# Patient Record
(demographics unavailable — no encounter records)

---

## 2024-11-15 NOTE — ASSESSMENT
[FreeTextEntry1] : Mr. Lopez is a 70 y/o M w/ h/o triple-vessel CAD (unrevascularizable), HFrEF/ICM (EF 20%, LVEDD 7.3 cm; Dx 9/23) w/o ICD w/ prior LV thrombus (resolved; on AC), prior tobacco use (1 ppd x 30 years; quit 10/23), PAD, infrarenal AAA (4.7 cm), HL, renal tumor s/p radical L nephrectomy (35 years prior) and R partial nephrectomy (? small carcinogenous spot), CKD (b/l Cr 1.3-1.5), prior DVT (s/p Rx) who is here for a follow up  Currently compensated, NYHA class II and euvolemic. Despite triple vessel disease, doesn't have any active angina and doesn't have revascularizable disease by PCI or CABG. Of note, had LV thrombus seen on MRI which was no longer seen on echo.   1. HFrEF/ICM  - c/w toprol 25 mg daily  - c/w Entresto 24/26 twice/day; repeat labs in office done with K 5.4 so unable to escalate - c/w hydralazine 10 mg TID - c/w isosorbide 5 mg TID - not on MRA due to creat 1.5->1.37, elevated K and solitary kidney  - on Farxiga 5 mg daily  - no diuretic requirement; given lasix 20 mg prn for weight gain - counseled on disease process  - maintain log of weight/BP - pt given HF booklet to review with his wife - repeat KARYN EF 20% - previously referred to EP for ICD eval  2. CAD - triple vessel disease; unrevascularizable - no active angina - off ASA and is on plavix and Lipitor and Zetia; continue Eliquis for LV thrombus - s/p cardiac MRI which was negative for viability  3. PAD/infrarenal AAA (4.7 cm) - encouraged to continue smoking cessation; didn't tolerate Wellbutrin - vascular dopplers form 1/8/24 which showed hemodynamically significant stenosis in R profunda femoral artery and left common iliac artery stenosis as well as saccular AAA 3.8 cm.  -following Dr. Justus Huerta (vascular surgeon at [facility]).  - BP and lipids control  4. LV thrombus - resolved - on Eliquis 5 mg twice/day; may be able to stop but defer to Dr. Lee  RTC 3 months PA, 6 months with me

## 2024-11-15 NOTE — ASSESSMENT
[FreeTextEntry1] : Mr. Lopez is a 68 y/o M w/ h/o triple-vessel CAD (unrevascularizable), HFrEF/ICM (EF 20%, LVEDD 7.3 cm; Dx 9/23) w/o ICD w/ prior LV thrombus (resolved; on AC), prior tobacco use (1 ppd x 30 years; quit 10/23), PAD, infrarenal AAA (4.7 cm), HL, renal tumor s/p radical L nephrectomy (35 years prior) and R partial nephrectomy (? small carcinogenous spot), CKD (b/l Cr 1.3-1.5), prior DVT (s/p Rx) who is here for a follow up  Currently compensated, NYHA class II and euvolemic. Despite triple vessel disease, doesn't have any active angina and doesn't have revascularizable disease by PCI or CABG. Of note, had LV thrombus seen on MRI which was no longer seen on echo.   1. HFrEF/ICM  - c/w toprol 25 mg daily  - c/w Entresto 24/26 twice/day; repeat labs in office done with K 5.4 so unable to escalate - c/w hydralazine 10 mg TID - c/w isosorbide 5 mg TID - not on MRA due to creat 1.5->1.37, elevated K and solitary kidney  - on Farxiga 5 mg daily  - no diuretic requirement; given lasix 20 mg prn for weight gain - counseled on disease process  - maintain log of weight/BP - pt given HF booklet to review with his wife - repeat KARYN EF 20% - previously referred to EP for ICD eval  2. CAD - triple vessel disease; unrevascularizable - no active angina - off ASA and is on plavix and Lipitor and Zetia; continue Eliquis for LV thrombus - s/p cardiac MRI which was negative for viability  3. PAD/infrarenal AAA (4.7 cm) - encouraged to continue smoking cessation; didn't tolerate Wellbutrin - vascular dopplers form 1/8/24 which showed hemodynamically significant stenosis in R profunda femoral artery and left common iliac artery stenosis as well as saccular AAA 3.8 cm.  -following Dr. Justus Huerta (vascular surgeon at [facility]).  - BP and lipids control  4. LV thrombus - resolved - on Eliquis 5 mg twice/day; may be able to stop but defer to Dr. Lee  RTC 3 months PA, 6 months with me

## 2024-11-15 NOTE — PHYSICAL EXAM
[Well Developed] : well developed [Well Nourished] : well nourished [No Acute Distress] : no acute distress [Normal Conjunctiva] : normal conjunctiva [Normal Venous Pressure] : normal venous pressure [No Carotid Bruit] : no carotid bruit [Normal S1, S2] : normal S1, S2 [No Murmur] : no murmur [No Rub] : no rub [No Gallop] : no gallop [Clear Lung Fields] : clear lung fields [Good Air Entry] : good air entry [No Respiratory Distress] : no respiratory distress  [Soft] : abdomen soft [Non Tender] : non-tender [No Masses/organomegaly] : no masses/organomegaly [Normal Bowel Sounds] : normal bowel sounds [Normal Gait] : normal gait [No Edema] : no edema [No Cyanosis] : no cyanosis [No Clubbing] : no clubbing [No Varicosities] : no varicosities [No Rash] : no rash [No Skin Lesions] : no skin lesions [Moves all extremities] : moves all extremities [No Focal Deficits] : no focal deficits [Normal Speech] : normal speech [Alert and Oriented] : alert and oriented [Normal memory] : normal memory [de-identified] : JVP 6 cm w/o JASONR

## 2024-11-15 NOTE — HISTORY OF PRESENT ILLNESS
[FreeTextEntry1] : Mr. Lopez is a 68 y/o M w/ h/o triple-vessel CAD (unrevascularizable), HFrEF/ICM (EF 20%, LVEDD 7.3 cm; Dx 9/23) w/o ICD w/ prior LV thrombus (resolved; on AC), prior tobacco use (1 ppd x 30 years; quit 10/23), PAD, infrarenal AAA (4.7 cm), HL, renal tumor s/p radical L nephrectomy (35 years prior) and R partial nephrectomy (? small carcinogenous spot), CKD (b/l Cr 1.3-1.5), prior DVT (s/p Rx) who is here for a follow up  Referred by Dr. Deep Bermudez. Also followed by Dr. Christopher Lee. Accompanied by wife, Laura.   For full initial details, please refer to note from 11/17/23.   In past month, had Entresto reduced from 49/51 to 24/26 twice/day due to hyperkalemia. Recently started on Lokelma 5 mg M/W/F. Has been on Farxiga 5 mg daily (hasn't tried 10 mg daily). Last labs 10/17/24 K 4.4, BUN/Cr 35/1.48.    He had a TTE on Aug 9 at Dr. Lee's office which showed EF 20%, LVEDD 6.9 cm, nl RV, mild MR, minimal TR w/ stasis noted but no thrombus.  He was referred to vascular Dr. Huerta ([facility]) for c/o cramping/tightening in legs bilaterally- vascular dopplers form 1/8/24 showed hemodynamically significant stenosis in R profunda femoral artery and left common iliac artery stenosis as well as saccular AAA 3.8 cm.  Has been on Lexapro 5 mg daily for depression with prn Xanax. No longer smoking and using nicotine gum for cravings.   He underwent endoscopy/colonoscopy with Dr. Parmar and treated for H.Pylori. 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   BP at home has been ranging 110-120s. Weight has been stable at 119 pounds. Doesn't use lasix.   Tries to be cognizant about sodium but has indiscretions. Adherent to medications. Drinks approx 3-5 bottles of fluid per day.   He denies changes to his activity and able to walk fair amount distance up to 1-2 miles several times a week. Able to go up 14 steps w/o issues. Denies orthopnea/PND  Denies chest discomfort, palpitations, dizziness/LH, syncope and he does not have an ICD.

## 2024-11-15 NOTE — HISTORY OF PRESENT ILLNESS
[FreeTextEntry1] : Mr. Lopez is a 68 y/o M w/ h/o triple-vessel CAD (unrevascularizable), HFrEF/ICM (EF 20%, LVEDD 7.3 cm; Dx 9/23) w/o ICD w/ prior LV thrombus (resolved; on AC), prior tobacco use (1 ppd x 30 years; quit 10/23), PAD, infrarenal AAA (4.7 cm), HL, renal tumor s/p radical L nephrectomy (35 years prior) and R partial nephrectomy (? small carcinogenous spot), CKD (b/l Cr 1.3-1.5), prior DVT (s/p Rx) who is here for a follow up  Referred by Dr. Deep Bermudez. Also followed by Dr. Christopher Lee. Accompanied by wife, Laura.   For full initial details, please refer to note from 11/17/23.   In past month, had Entresto reduced from 49/51 to 24/26 twice/day due to hyperkalemia. Recently started on Lokelma 5 mg M/W/F. Has been on Farxiga 5 mg daily (hasn't tried 10 mg daily). Last labs 10/17/24 K 4.4, BUN/Cr 35/1.48.    He had a TTE on Aug 9 at Dr. Lee's office which showed EF 20%, LVEDD 6.9 cm, nl RV, mild MR, minimal TR w/ stasis noted but no thrombus.  He was referred to vascular Dr. Huerta ([facility]) for c/o cramping/tightening in legs bilaterally- vascular dopplers form 1/8/24 showed hemodynamically significant stenosis in R profunda femoral artery and left common iliac artery stenosis as well as saccular AAA 3.8 cm.  Has been on Lexapro 5 mg daily for depression with prn Xanax. No longer smoking and using nicotine gum for cravings.   He underwent endoscopy/colonoscopy with Dr. Parmar and treated for H.Pylori. 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   BP at home has been ranging 110-120s. Weight has been stable at 119 pounds. Doesn't use lasix.   Tries to be cognizant about sodium but has indiscretions. Adherent to medications. Drinks approx 3-5 bottles of fluid per day.   He denies changes to his activity and able to walk fair amount distance up to 1-2 miles several times a week. Able to go up 14 steps w/o issues. Denies orthopnea/PND  Denies chest discomfort, palpitations, dizziness/LH, syncope and he does not have an ICD.

## 2024-11-15 NOTE — PHYSICAL EXAM
[Well Developed] : well developed [Well Nourished] : well nourished [No Acute Distress] : no acute distress [Normal Conjunctiva] : normal conjunctiva [Normal Venous Pressure] : normal venous pressure [No Carotid Bruit] : no carotid bruit [Normal S1, S2] : normal S1, S2 [No Murmur] : no murmur [No Rub] : no rub [No Gallop] : no gallop [Clear Lung Fields] : clear lung fields [Good Air Entry] : good air entry [No Respiratory Distress] : no respiratory distress  [Soft] : abdomen soft [Non Tender] : non-tender [No Masses/organomegaly] : no masses/organomegaly [Normal Bowel Sounds] : normal bowel sounds [Normal Gait] : normal gait [No Edema] : no edema [No Cyanosis] : no cyanosis [No Clubbing] : no clubbing [No Varicosities] : no varicosities [No Rash] : no rash [No Skin Lesions] : no skin lesions [Moves all extremities] : moves all extremities [No Focal Deficits] : no focal deficits [Normal Speech] : normal speech [Alert and Oriented] : alert and oriented [Normal memory] : normal memory [de-identified] : JVP 6 cm w/o JASONR

## 2024-11-15 NOTE — CARDIOLOGY SUMMARY
[de-identified] : 11/13/24 - sinus, NSR, PRWP 4/10/24 NSR 63, PRWP, TWI laterally (unchanged) 2/6/24 - NSR, PRWP, TWI laterally (unchanged) 1/4/24-NSR, PRWP, NSST with TWI 1, AVL, V3-V6 11/17/23 - NSR, APC, PRWP [de-identified] : 6/14/24 - pharmacologic stress test - EF 15%, severe reduced perfusion defect large size in apical wall (fixed), severe large perfusion defect inferior wall (fixed), mod reduced perfusion defect of large size in lateral wall (partially reversible), mod reduced perfusion defect large size septal wall (partially reversible) [de-identified] : 8/9/24 - EF 20%, LVEDD 6.9 cm, nl RV size/function, mild MR, mild TR, E/e' 10  10/9/23 - EF 20%, LVEDD 7.3 cm, mild RV dilatation/dysfunction [de-identified] : 10/1/23 CTA AP - complex aorta with intraluminal plaque or eccentric thrombus in the tortuous intrathoracic aorta. 4.7 cm maximum diameter infrarenal aortic aneurysm. High-grade appearing stenosis at the origin of the celiac axis. Multifocal fairly high grade common iliac artery stenoses, especially at the origin of the left. [de-identified] : 11/21/23 MRI - severely dilated left ventricle with severely reduced overall systolic function (ejection fraction 13%). Myocardial infarction in a multi-vessel distribution. Predominantly non-viable myocardium in the anterior, lateral, and inferior walls. Viable myocardium in the basal to mid anteroseptum and inferoseptum. Mural thrombus at the apical cap of the left ventricle measuring approximately 18 mm x 7 mm. nl RV size/function. Severe atheroma in descending thoracic and abdominal aorta.  [de-identified] : 9/29/23 prox % stenosis, mid % stenosis, distal % stenosis, RAMUS 99% stenosis, prox Cx 35 % stenosis, Dist Cx 40% stenosis, prox RCA 50% stenosis, mid % stenosis, distal RCA 45% stenosis.  [de-identified] : 10/9/23 - carotid dopplers - mod plaque in prox JOE with normal velocities (50-60%); mod plaque in LICA (<50%)

## 2024-11-15 NOTE — CARDIOLOGY SUMMARY
[de-identified] : 11/13/24 - sinus, NSR, PRWP 4/10/24 NSR 63, PRWP, TWI laterally (unchanged) 2/6/24 - NSR, PRWP, TWI laterally (unchanged) 1/4/24-NSR, PRWP, NSST with TWI 1, AVL, V3-V6 11/17/23 - NSR, APC, PRWP [de-identified] : 6/14/24 - pharmacologic stress test - EF 15%, severe reduced perfusion defect large size in apical wall (fixed), severe large perfusion defect inferior wall (fixed), mod reduced perfusion defect of large size in lateral wall (partially reversible), mod reduced perfusion defect large size septal wall (partially reversible) [de-identified] : 8/9/24 - EF 20%, LVEDD 6.9 cm, nl RV size/function, mild MR, mild TR, E/e' 10  10/9/23 - EF 20%, LVEDD 7.3 cm, mild RV dilatation/dysfunction [de-identified] : 10/1/23 CTA AP - complex aorta with intraluminal plaque or eccentric thrombus in the tortuous intrathoracic aorta. 4.7 cm maximum diameter infrarenal aortic aneurysm. High-grade appearing stenosis at the origin of the celiac axis. Multifocal fairly high grade common iliac artery stenoses, especially at the origin of the left. [de-identified] : 11/21/23 MRI - severely dilated left ventricle with severely reduced overall systolic function (ejection fraction 13%). Myocardial infarction in a multi-vessel distribution. Predominantly non-viable myocardium in the anterior, lateral, and inferior walls. Viable myocardium in the basal to mid anteroseptum and inferoseptum. Mural thrombus at the apical cap of the left ventricle measuring approximately 18 mm x 7 mm. nl RV size/function. Severe atheroma in descending thoracic and abdominal aorta.  [de-identified] : 9/29/23 prox % stenosis, mid % stenosis, distal % stenosis, RAMUS 99% stenosis, prox Cx 35 % stenosis, Dist Cx 40% stenosis, prox RCA 50% stenosis, mid % stenosis, distal RCA 45% stenosis.  [de-identified] : 10/9/23 - carotid dopplers - mod plaque in prox JOE with normal velocities (50-60%); mod plaque in LICA (<50%)

## 2025-02-12 NOTE — CARDIOLOGY SUMMARY
[de-identified] : 2/11/25 Sinus Bradycardia, 50bpm, Old inf/lat infarct, PRWP,  Diffuse nonspecific T-abnormality. 11/13/24 - sinus, NSR, PRWP 4/10/24 NSR 63, PRWP, TWI laterally (unchanged) 2/6/24 - NSR, PRWP, TWI laterally (unchanged) 1/4/24-NSR, PRWP, NSST with TWI 1, AVL, V3-V6 11/17/23 - NSR, APC, PRWP [de-identified] : 6/14/24 - pharmacologic stress test - EF 15%, severe reduced perfusion defect large size in apical wall (fixed), severe large perfusion defect inferior wall (fixed), mod reduced perfusion defect of large size in lateral wall (partially reversible), mod reduced perfusion defect large size septal wall (partially reversible) [de-identified] : 8/9/24 - EF 20%, LVEDD 6.9 cm, nl RV size/function, mild MR, mild TR, E/e' 10  10/9/23 - EF 20%, LVEDD 7.3 cm, mild RV dilatation/dysfunction [de-identified] : 10/1/23 CTA AP - complex aorta with intraluminal plaque or eccentric thrombus in the tortuous intrathoracic aorta. 4.7 cm maximum diameter infrarenal aortic aneurysm. High-grade appearing stenosis at the origin of the celiac axis. Multifocal fairly high grade common iliac artery stenoses, especially at the origin of the left. [de-identified] : 11/21/23 MRI - severely dilated left ventricle with severely reduced overall systolic function (ejection fraction 13%). Myocardial infarction in a multi-vessel distribution. Predominantly non-viable myocardium in the anterior, lateral, and inferior walls. Viable myocardium in the basal to mid anteroseptum and inferoseptum. Mural thrombus at the apical cap of the left ventricle measuring approximately 18 mm x 7 mm. nl RV size/function. Severe atheroma in descending thoracic and abdominal aorta.  [de-identified] : 9/29/23 prox % stenosis, mid % stenosis, distal % stenosis, RAMUS 99% stenosis, prox Cx 35 % stenosis, Dist Cx 40% stenosis, prox RCA 50% stenosis, mid % stenosis, distal RCA 45% stenosis.  [de-identified] : 10/9/23 - carotid dopplers - mod plaque in prox JOE with normal velocities (50-60%); mod plaque in LICA (<50%)

## 2025-02-12 NOTE — ASSESSMENT
[FreeTextEntry1] : Mr. Lopez is a 70 y/o M w/ h/o triple-vessel CAD (unrevascularizable), HFrEF/ICM (EF 20%, LVEDD 7.3 cm; Dx 9/23) w/o ICD w/ prior LV thrombus (resolved; on AC), prior tobacco use (1 ppd x 30 years; quit 10/23), PAD, infrarenal AAA (4.7 cm), HL, renal tumor s/p radical L nephrectomy (35 years prior) and R partial nephrectomy (? small carcinogenous spot), CKD (b/l Cr 1.3-1.5), prior DVT (s/p Rx) who is here for a follow up  Currently compensated, NYHA class II and euvolemic. Despite triple vessel disease, doesn't have any active angina and doesn't have revascularizable disease by PCI or CABG. Of note, had LV thrombus seen on MRI which was no longer seen on echo.   1. HFrEF/ICM  - Recent Labs from Dr. Gonzalez, Ken, Renal and Dr. Carvalho to be scanned in, all are ok - c/w toprol 25 mg daily  - c/w Entresto 24/26 twice/day; previously repeat labs in office done with K 5.4 so unable to escalate - c/w hydralazine 10 mg TID - c/w isosorbide 5 mg TID, has been HTN, will inc Isosorbide 10mg TID, titrate up slowly during the week.  - not on MRA due to creat 1.5->1.37, elevated K and solitary kidney, now on Lokelma  - on Farxiga 5 mg daily  - no diuretic requirement; given lasix 20 mg prn for weight gain - counseled on disease process  - maintain log of weight/BP - pt given HF booklet to review with his wife - repeat KARYN EF 20% - previously referred to EP for ICD eval, pt considering an evaluation with with Dr. Perez (was previously reluctant because he is afraid of placing a device)  2. CAD - triple vessel disease; unrevascularizable - no active angina - off ASA and is on plavix and Lipitor and Zetia; continue Eliquis for Hx LV thrombus - s/p cardiac MRI which was negative for viability  3. PAD/infrarenal AAA (4.7 cm) - encouraged to continue smoking cessation; didn't tolerate Wellbutrin - vascular dopplers form 1/8/24 which showed hemodynamically significant stenosis in R profunda femoral artery and left common iliac artery stenosis as well as saccular AAA 3.8 cm.  -following Dr. Justus Huerta (vascular surgeon at [facility]).  - BP and lipids control  4. LV thrombus - resolved - on Eliquis 5 mg twice/day; may be able to stop but defer to Dr. Lee  RTC 2 months with Dr. Euceda

## 2025-02-12 NOTE — CARDIOLOGY SUMMARY
[de-identified] : 2/11/25 Sinus Bradycardia, 50bpm, Old inf/lat infarct, PRWP,  Diffuse nonspecific T-abnormality. 11/13/24 - sinus, NSR, PRWP 4/10/24 NSR 63, PRWP, TWI laterally (unchanged) 2/6/24 - NSR, PRWP, TWI laterally (unchanged) 1/4/24-NSR, PRWP, NSST with TWI 1, AVL, V3-V6 11/17/23 - NSR, APC, PRWP [de-identified] : 6/14/24 - pharmacologic stress test - EF 15%, severe reduced perfusion defect large size in apical wall (fixed), severe large perfusion defect inferior wall (fixed), mod reduced perfusion defect of large size in lateral wall (partially reversible), mod reduced perfusion defect large size septal wall (partially reversible) [de-identified] : 8/9/24 - EF 20%, LVEDD 6.9 cm, nl RV size/function, mild MR, mild TR, E/e' 10  10/9/23 - EF 20%, LVEDD 7.3 cm, mild RV dilatation/dysfunction [de-identified] : 10/1/23 CTA AP - complex aorta with intraluminal plaque or eccentric thrombus in the tortuous intrathoracic aorta. 4.7 cm maximum diameter infrarenal aortic aneurysm. High-grade appearing stenosis at the origin of the celiac axis. Multifocal fairly high grade common iliac artery stenoses, especially at the origin of the left. [de-identified] : 11/21/23 MRI - severely dilated left ventricle with severely reduced overall systolic function (ejection fraction 13%). Myocardial infarction in a multi-vessel distribution. Predominantly non-viable myocardium in the anterior, lateral, and inferior walls. Viable myocardium in the basal to mid anteroseptum and inferoseptum. Mural thrombus at the apical cap of the left ventricle measuring approximately 18 mm x 7 mm. nl RV size/function. Severe atheroma in descending thoracic and abdominal aorta.  [de-identified] : 9/29/23 prox % stenosis, mid % stenosis, distal % stenosis, RAMUS 99% stenosis, prox Cx 35 % stenosis, Dist Cx 40% stenosis, prox RCA 50% stenosis, mid % stenosis, distal RCA 45% stenosis.  [de-identified] : 10/9/23 - carotid dopplers - mod plaque in prox JOE with normal velocities (50-60%); mod plaque in LICA (<50%)

## 2025-02-12 NOTE — ASSESSMENT
[FreeTextEntry1] : Mr. Lopez is a 70 y/o M w/ h/o triple-vessel CAD (unrevascularizable), HFrEF/ICM (EF 20%, LVEDD 7.3 cm; Dx 9/23) w/o ICD w/ prior LV thrombus (resolved; on AC), prior tobacco use (1 ppd x 30 years; quit 10/23), PAD, infrarenal AAA (4.7 cm), HL, renal tumor s/p radical L nephrectomy (35 years prior) and R partial nephrectomy (? small carcinogenous spot), CKD (b/l Cr 1.3-1.5), prior DVT (s/p Rx) who is here for a follow up  Currently compensated, NYHA class II and euvolemic. Despite triple vessel disease, doesn't have any active angina and doesn't have revascularizable disease by PCI or CABG. Of note, had LV thrombus seen on MRI which was no longer seen on echo.   1. HFrEF/ICM  - Recent Labs from Dr. Gonzalez, Ken, Renal and Dr. Carvalho to be scanned in, all are ok - c/w toprol 25 mg daily  - c/w Entresto 24/26 twice/day; previously repeat labs in office done with K 5.4 so unable to escalate - c/w hydralazine 10 mg TID - c/w isosorbide 5 mg TID, has been HTN, will inc Isosorbide 10mg TID, titrate up slowly during the week.  - not on MRA due to creat 1.5->1.37, elevated K and solitary kidney, now on Lokelma  - on Farxiga 5 mg daily  - no diuretic requirement; given lasix 20 mg prn for weight gain - counseled on disease process  - maintain log of weight/BP - pt given HF booklet to review with his wife - repeat KARYN EF 20% - previously referred to EP for ICD eval, pt considering an evaluation with with Dr. Perez (was previously reluctant because he is afraid of placing a device)  2. CAD - triple vessel disease; unrevascularizable - no active angina - off ASA and is on plavix and Lipitor and Zetia; continue Eliquis for Hx LV thrombus - s/p cardiac MRI which was negative for viability  3. PAD/infrarenal AAA (4.7 cm) - encouraged to continue smoking cessation; didn't tolerate Wellbutrin - vascular dopplers form 1/8/24 which showed hemodynamically significant stenosis in R profunda femoral artery and left common iliac artery stenosis as well as saccular AAA 3.8 cm.  -following Dr. Justus Huerta (vascular surgeon at [facility]).  - BP and lipids control  4. LV thrombus - resolved - on Eliquis 5 mg twice/day; may be able to stop but defer to Dr. Lee  RTC 2 months with Dr. Euceda

## 2025-02-12 NOTE — ASSESSMENT
[FreeTextEntry1] : Mr. Lopez is a 68 y/o M w/ h/o triple-vessel CAD (unrevascularizable), HFrEF/ICM (EF 20%, LVEDD 7.3 cm; Dx 9/23) w/o ICD w/ prior LV thrombus (resolved; on AC), prior tobacco use (1 ppd x 30 years; quit 10/23), PAD, infrarenal AAA (4.7 cm), HL, renal tumor s/p radical L nephrectomy (35 years prior) and R partial nephrectomy (? small carcinogenous spot), CKD (b/l Cr 1.3-1.5), prior DVT (s/p Rx) who is here for a follow up  Currently compensated, NYHA class II and euvolemic. Despite triple vessel disease, doesn't have any active angina and doesn't have revascularizable disease by PCI or CABG. Of note, had LV thrombus seen on MRI which was no longer seen on echo.   1. HFrEF/ICM  - Recent Labs from Dr. Gonzalez, Ken, Renal and Dr. Carvalho to be scanned in, all are ok - c/w toprol 25 mg daily  - c/w Entresto 24/26 twice/day; previously repeat labs in office done with K 5.4 so unable to escalate - c/w hydralazine 10 mg TID - c/w isosorbide 5 mg TID, has been HTN, will inc Isosorbide 10mg TID, titrate up slowly during the week.  - not on MRA due to creat 1.5->1.37, elevated K and solitary kidney, now on Lokelma  - on Farxiga 5 mg daily  - no diuretic requirement; given lasix 20 mg prn for weight gain - counseled on disease process  - maintain log of weight/BP - pt given HF booklet to review with his wife - repeat KARYN EF 20% - previously referred to EP for ICD eval, pt considering an evaluation with with Dr. Perez (was previously reluctant because he is afraid of placing a device)  2. CAD - triple vessel disease; unrevascularizable - no active angina - off ASA and is on plavix and Lipitor and Zetia; continue Eliquis for Hx LV thrombus - s/p cardiac MRI which was negative for viability  3. PAD/infrarenal AAA (4.7 cm) - encouraged to continue smoking cessation; didn't tolerate Wellbutrin - vascular dopplers form 1/8/24 which showed hemodynamically significant stenosis in R profunda femoral artery and left common iliac artery stenosis as well as saccular AAA 3.8 cm.  -following Dr. Justus Huerta (vascular surgeon at [facility]).  - BP and lipids control  4. LV thrombus - resolved - on Eliquis 5 mg twice/day; may be able to stop but defer to Dr. Lee  RTC 2 months with Dr. Euceda

## 2025-02-12 NOTE — HISTORY OF PRESENT ILLNESS
[FreeTextEntry1] : Mr. Lopez is a 70 y/o M w/ h/o triple-vessel CAD (unrevascularizable), HFrEF/ICM (EF 20%, LVEDD 7.3 cm; Dx 9/23) w/o ICD w/ prior LV thrombus (resolved; on AC), prior tobacco use (1 ppd x 30 years; quit 10/23), PAD, infrarenal AAA (4.7 cm), HL, renal tumor s/p radical L nephrectomy (35 years prior) and R partial nephrectomy (? small carcinogenous spot), CKD (b/l Cr 1.3-1.5), prior DVT (s/p Rx) who is here for a follow up  Referred by Dr. Deep Bermudez. Also followed by Dr. Christopher Lee. Accompanied by wife, Laura.   For full initial details, please refer to note from 11/17/23.   No hospitalizations since last visit  11/13/24.    Previously had Entresto reduced from 49/51 to 24/26 twice/day due to hyperkalemia and started on Lokelma 5 mg M/W/F. Has been on Farxiga 5 mg daily (hasn't tried 10 mg daily).   He had a TTE on Aug 9 at Dr. Lee's office which showed EF 20%, LVEDD 6.9 cm, nl RV, mild MR, minimal TR w/ stasis noted but no thrombus.  He was referred to vascular Dr. Huerta ([facility]) for c/o cramping/tightening in legs bilaterally- vascular dopplers form 1/8/24 showed hemodynamically significant stenosis in R profunda femoral artery and left common iliac artery stenosis as well as saccular AAA 3.8 cm.  Has been on Lexapro 5 mg daily (but he hasn't taking it every day because he said he doesn't want to get addicted to it) for depression with prn Xanax. No longer smoking and using gum for cravings.   He underwent endoscopy/colonoscopy with Dr. Parmar and treated for H.Pylori. 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   BP at home has been ranging 120-127s in office today 132/68. Weight has been stable at 118-122 pounds in office today is 120lbs.  Hasn't used PRN Lasix.   Tries to be cognizant about sodium but has indiscretions. Adherent to medications. Drinks approx 3-5 bottles of fluid per day.   He continues to deny changes to his activity and able to walk fair amount distance up to 1-2 miles several times a week. Able to go up 14 steps w/o issues. Denies orthopnea/PND  He does not have an ICD.  He states he is afraid to get it done.   Denies chest discomfort, palpitations, dizziness/LH, syncope and he does not have an ICD.

## 2025-02-12 NOTE — HISTORY OF PRESENT ILLNESS
[FreeTextEntry1] : Mr. Lopez is a 70 y/o M w/ h/o triple-vessel CAD (unrevascularizable), HFrEF/ICM (EF 20%, LVEDD 7.3 cm; Dx 9/23) w/o ICD w/ prior LV thrombus (resolved; on AC), prior tobacco use (1 ppd x 30 years; quit 10/23), PAD, infrarenal AAA (4.7 cm), HL, renal tumor s/p radical L nephrectomy (35 years prior) and R partial nephrectomy (? small carcinogenous spot), CKD (b/l Cr 1.3-1.5), prior DVT (s/p Rx) who is here for a follow up  Referred by Dr. Deep Bermudez. Also followed by Dr. Christopher Lee. Accompanied by wife, Laura.   For full initial details, please refer to note from 11/17/23.   No hospitalizations since last visit  11/13/24.    Previously had Entresto reduced from 49/51 to 24/26 twice/day due to hyperkalemia and started on Lokelma 5 mg M/W/F. Has been on Farxiga 5 mg daily (hasn't tried 10 mg daily).   He had a TTE on Aug 9 at Dr. Lee's office which showed EF 20%, LVEDD 6.9 cm, nl RV, mild MR, minimal TR w/ stasis noted but no thrombus.  He was referred to vascular Dr. Huerta ([facility]) for c/o cramping/tightening in legs bilaterally- vascular dopplers form 1/8/24 showed hemodynamically significant stenosis in R profunda femoral artery and left common iliac artery stenosis as well as saccular AAA 3.8 cm.  Has been on Lexapro 5 mg daily (but he hasn't taking it every day because he said he doesn't want to get addicted to it) for depression with prn Xanax. No longer smoking and using gum for cravings.   He underwent endoscopy/colonoscopy with Dr. Parmar and treated for H.Pylori. 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   BP at home has been ranging 120-127s in office today 132/68. Weight has been stable at 118-122 pounds in office today is 120lbs.  Hasn't used PRN Lasix.   Tries to be cognizant about sodium but has indiscretions. Adherent to medications. Drinks approx 3-5 bottles of fluid per day.   He continues to deny changes to his activity and able to walk fair amount distance up to 1-2 miles several times a week. Able to go up 14 steps w/o issues. Denies orthopnea/PND  He does not have an ICD.  He states he is afraid to get it done.   Denies chest discomfort, palpitations, dizziness/LH, syncope and he does not have an ICD.

## 2025-02-12 NOTE — CARDIOLOGY SUMMARY
[de-identified] : 2/11/25 Sinus Bradycardia, 50bpm, Old inf/lat infarct, PRWP,  Diffuse nonspecific T-abnormality. 11/13/24 - sinus, NSR, PRWP 4/10/24 NSR 63, PRWP, TWI laterally (unchanged) 2/6/24 - NSR, PRWP, TWI laterally (unchanged) 1/4/24-NSR, PRWP, NSST with TWI 1, AVL, V3-V6 11/17/23 - NSR, APC, PRWP [de-identified] : 6/14/24 - pharmacologic stress test - EF 15%, severe reduced perfusion defect large size in apical wall (fixed), severe large perfusion defect inferior wall (fixed), mod reduced perfusion defect of large size in lateral wall (partially reversible), mod reduced perfusion defect large size septal wall (partially reversible) [de-identified] : 8/9/24 - EF 20%, LVEDD 6.9 cm, nl RV size/function, mild MR, mild TR, E/e' 10  10/9/23 - EF 20%, LVEDD 7.3 cm, mild RV dilatation/dysfunction [de-identified] : 10/1/23 CTA AP - complex aorta with intraluminal plaque or eccentric thrombus in the tortuous intrathoracic aorta. 4.7 cm maximum diameter infrarenal aortic aneurysm. High-grade appearing stenosis at the origin of the celiac axis. Multifocal fairly high grade common iliac artery stenoses, especially at the origin of the left. [de-identified] : 11/21/23 MRI - severely dilated left ventricle with severely reduced overall systolic function (ejection fraction 13%). Myocardial infarction in a multi-vessel distribution. Predominantly non-viable myocardium in the anterior, lateral, and inferior walls. Viable myocardium in the basal to mid anteroseptum and inferoseptum. Mural thrombus at the apical cap of the left ventricle measuring approximately 18 mm x 7 mm. nl RV size/function. Severe atheroma in descending thoracic and abdominal aorta.  [de-identified] : 9/29/23 prox % stenosis, mid % stenosis, distal % stenosis, RAMUS 99% stenosis, prox Cx 35 % stenosis, Dist Cx 40% stenosis, prox RCA 50% stenosis, mid % stenosis, distal RCA 45% stenosis.  [de-identified] : 10/9/23 - carotid dopplers - mod plaque in prox JOE with normal velocities (50-60%); mod plaque in LICA (<50%)

## 2025-02-12 NOTE — HISTORY OF PRESENT ILLNESS
[FreeTextEntry1] : Mr. Lopez is a 70 y/o M w/ h/o triple-vessel CAD (unrevascularizable), HFrEF/ICM (EF 20%, LVEDD 7.3 cm; Dx 9/23) w/o ICD w/ prior LV thrombus (resolved; on AC), prior tobacco use (1 ppd x 30 years; quit 10/23), PAD, infrarenal AAA (4.7 cm), HL, renal tumor s/p radical L nephrectomy (35 years prior) and R partial nephrectomy (? small carcinogenous spot), CKD (b/l Cr 1.3-1.5), prior DVT (s/p Rx) who is here for a follow up  Referred by Dr. Deep Bermudez. Also followed by Dr. Christopher Lee. Accompanied by wife, Laura.   For full initial details, please refer to note from 11/17/23.   No hospitalizations since last visit  11/13/24.    Previously had Entresto reduced from 49/51 to 24/26 twice/day due to hyperkalemia and started on Lokelma 5 mg M/W/F. Has been on Farxiga 5 mg daily (hasn't tried 10 mg daily).   He had a TTE on Aug 9 at Dr. Lee's office which showed EF 20%, LVEDD 6.9 cm, nl RV, mild MR, minimal TR w/ stasis noted but no thrombus.  He was referred to vascular Dr. Huerta ([facility]) for c/o cramping/tightening in legs bilaterally- vascular dopplers form 1/8/24 showed hemodynamically significant stenosis in R profunda femoral artery and left common iliac artery stenosis as well as saccular AAA 3.8 cm.  Has been on Lexapro 5 mg daily (but he hasn't taking it every day because he said he doesn't want to get addicted to it) for depression with prn Xanax. No longer smoking and using gum for cravings.   He underwent endoscopy/colonoscopy with Dr. Parmar and treated for H.Pylori. 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   BP at home has been ranging 120-127s in office today 132/68. Weight has been stable at 118-122 pounds in office today is 120lbs.  Hasn't used PRN Lasix.   Tries to be cognizant about sodium but has indiscretions. Adherent to medications. Drinks approx 3-5 bottles of fluid per day.   He continues to deny changes to his activity and able to walk fair amount distance up to 1-2 miles several times a week. Able to go up 14 steps w/o issues. Denies orthopnea/PND  He does not have an ICD.  He states he is afraid to get it done.   Denies chest discomfort, palpitations, dizziness/LH, syncope and he does not have an ICD.

## 2025-03-10 NOTE — CARDIOLOGY SUMMARY
[de-identified] :  6/14/24 - pharmacologic stress test - EF 15%, severe reduced perfusion defect large size in apical wall (fixed), severe large perfusion defect inferior wall (fixed), mod reduced perfusion defect of large size in lateral wall (partially reversible), mod reduced perfusion defect large size septal wall (partially reversible)   [de-identified] : 8/9/24 - EF 20%, LVEDD 6.9 cm, nl RV size/function, mild MR, mild TR, E/e' 10 10/9/23 - EF 20%, LVEDD 7.3 cm, mild RV dilatation/dysfunction   [de-identified] : 10/1/23 CTA AP - complex aorta with intraluminal plaque or eccentric thrombus in the tortuous intrathoracic aorta. 4.7 cm maximum diameter infrarenal aortic aneurysm. High-grade appearing stenosis at the origin of the celiac axis. Multifocal fairly high grade common iliac artery stenoses, especially at the origin of the left.

## 2025-03-10 NOTE — DISCUSSION/SUMMARY
[EKG obtained to assist in diagnosis and management of assessed problem(s)] : EKG obtained to assist in diagnosis and management of assessed problem(s) [FreeTextEntry1] : Impression:  1. HFrEF:  EKG performed today to assess for presence of conduction disease and reveals sinus bradycardia. EF remains 20% with GDMT, recommendation for dual chamber ICD placement. A thorough discussion was had with the patient concerning all aspects of ICD therapy. We reviewed the data supporting ICD therapy and how it applies individually.  We discussed the procedures, risks and outcomes of ICD implantation an living with an ICD. We discussed management of ICD therapy throughout life, including deactivation of the ICD. After all questions were answered, and literature was provided, it was a shared decision to proceed with ICD therapy. Hold diabetes medication and blood thinners the morning of the procedure, if applicable. May take all other medication with a small sip of water. Still unsure if he wants to proceed, will consider and call if chooses to schedule. Resume OMT as prescribed, encouraged heart healthy diet, daily weight, and regular f/u with Cardiology/Heart failure team as scheduled.  2. HLD: resume statin therapy as prescribed and regular f/u with Cardiologist for routine lipid monitoring and management.  Resume regular f/u with Cardiologist and may RTO as needed or if any new or worsening symptoms occur.

## 2025-03-10 NOTE — HISTORY OF PRESENT ILLNESS
[FreeTextEntry1] : Mr. Lopez is a 68 y/o man with PMH triple-vessel CAD (unrevascularizable), HFrEF/ICM (EF 20%, LVEDD 7.3 cm; Dx 9/23) w/o ICD w/ prior LV thrombus (resolved; on AC), prior tobacco use (1 ppd x 30 years; quit 10/23), PAD, infrarenal AAA (4.7 cm), HL, renal tumor s/p radical L nephrectomy (35 years prior) and R partial nephrectomy (? small carcinogenous spot), CKD (b/l Cr 1.3-1.5), prior DVT (s/p Rx) who is here for initial evaluation. He has a refused ICD as he was afraid in the past currently on GDMT. He now open to considering ICD placement as his EF remains low 20%. Patient endorses fatigue continuously that has worsened. Denies chest pain, palpitations, SOB, syncope or near syncope.

## 2025-03-10 NOTE — REASON FOR VISIT
[Cardiac Failure] : cardiac failure [Arrhythmia/ECG Abnorrmalities] : arrhythmia/ECG abnormalities [FreeTextEntry3] : Dr. Euceda

## 2025-04-25 NOTE — ASSESSMENT
[FreeTextEntry1] : Mr. Lopez is a 70 y/o M w/ h/o triple-vessel CAD (unrevascularizable), HFrEF/ICM (EF 20%, LVEDD 7.3 cm; Dx 9/23) w/o ICD w/ prior LV thrombus (resolved; on AC), prior tobacco use (1 ppd x 30 years; quit 10/23), PAD, infrarenal AAA (4.7 cm), mesenteric arterial disease (celiac artery stenosis, occluded ROMARIO), HL, renal tumor s/p radical L nephrectomy (35 years prior) and R partial nephrectomy (? small carcinogenous spot), CKD (b/l Cr 1.3-1.5), prior DVT (s/p Rx) who is here for a follow up. Currently compensated, NYHA class II and euvolemic but limited mainly due to claudication. Despite triple vessel disease, doesn't have any active angina and doesn't have revascularizable disease by PCI or CABG. Of note, had LV thrombus seen on MRI which was no longer seen on echo.   1. HFrEF/ICM  - Recent Labs from Dr. Gonzalez reviewed - c/w toprol 25 mg daily  - c/w Entresto 24/26 twice/day; unable to uptitrate d/t K 5.0 - c/w hydralazine 10 mg TID; will increase to 25 mg three times/day - c/w isosorbide 10 mg TID - not on MRA due to creat 1.5->1.37, elevated K and solitary kidney, now on Lokelma 10 gm three times/week; would like to increase to 10 grams daily; will d/w Dr. Gonzalez - on Farxiga 5 mg daily; ideally would benefit from 10 mg daily  - no diuretic requirement; given lasix 20 mg prn for weight gain - counseled on disease process  - maintain log of weight/BP - pt given HF booklet to review with his wife - repeat KARYN EF 20% - previously referred to EP for ICD eval, pt seen by Dr. Perez (was previously reluctant because he is afraid of placing a device)  2. CAD - triple vessel disease; unrevascularizable - no active angina - off ASA and is on plavix and Lipitor and Zetia; continue Eliquis for Hx LV thrombus - s/p cardiac MRI which was negative for viability - may benefit from PCSK9i given extent of disease  3. PAD/infrarenal AAA (4.7 cm) - encouraged to continue smoking cessation; didn't tolerate Wellbutrin - vascular dopplers form 1/8/24 which showed hemodynamically significant stenosis in R profunda femoral artery and left common iliac artery stenosis as well as saccular AAA 3.8 cm.  -following Dr. Justus Huerta (vascular surgeon at [facility]).  - BP and lipids control  4. LV thrombus - resolved - c/w Eliquis 5 mg twice/day   RTC 3 months with PA

## 2025-04-25 NOTE — CARDIOLOGY SUMMARY
[de-identified] : 4/25/25 - unchanged 2/11/25 Sinus Bradycardia, 50bpm, Old inf/lat infarct, PRWP,  Diffuse nonspecific T-abnormality. 11/13/24 - sinus, NSR, PRWP 4/10/24 NSR 63, PRWP, TWI laterally (unchanged) 2/6/24 - NSR, PRWP, TWI laterally (unchanged) 1/4/24-NSR, PRWP, NSST with TWI 1, AVL, V3-V6 11/17/23 - NSR, APC, PRWP [de-identified] : 6/14/24 - pharmacologic stress test - EF 15%, severe reduced perfusion defect large size in apical wall (fixed), severe large perfusion defect inferior wall (fixed), mod reduced perfusion defect of large size in lateral wall (partially reversible), mod reduced perfusion defect large size septal wall (partially reversible) [de-identified] : 8/9/24 - EF 20%, LVEDD 6.9 cm, nl RV size/function, mild MR, mild TR, E/e' 10  10/9/23 - EF 20%, LVEDD 7.3 cm, mild RV dilatation/dysfunction [de-identified] : 10/1/23 CTA AP - complex aorta with intraluminal plaque or eccentric thrombus in the tortuous intrathoracic aorta. 4.7 cm maximum diameter infrarenal aortic aneurysm. High-grade appearing stenosis at the origin of the celiac axis. Multifocal fairly high grade common iliac artery stenoses, especially at the origin of the left. [de-identified] : 11/21/23 MRI - severely dilated left ventricle with severely reduced overall systolic function (ejection fraction 13%). Myocardial infarction in a multi-vessel distribution. Predominantly non-viable myocardium in the anterior, lateral, and inferior walls. Viable myocardium in the basal to mid anteroseptum and inferoseptum. Mural thrombus at the apical cap of the left ventricle measuring approximately 18 mm x 7 mm. nl RV size/function. Severe atheroma in descending thoracic and abdominal aorta.  [de-identified] : 10/9/23 - carotid dopplers - mod plaque in prox JOE with normal velocities (50-60%); mod plaque in LICA (<50%) [de-identified] : 9/29/23 prox % stenosis, mid % stenosis, distal % stenosis, RAMUS 99% stenosis, prox Cx 35 % stenosis, Dist Cx 40% stenosis, prox RCA 50% stenosis, mid % stenosis, distal RCA 45% stenosis.

## 2025-04-25 NOTE — HISTORY OF PRESENT ILLNESS
[FreeTextEntry1] : Mr. Lopez is a 70 y/o M w/ h/o triple-vessel CAD (unrevascularizable), HFrEF/ICM (EF 20%, LVEDD 7.3 cm; Dx 9/23) w/o ICD w/ prior LV thrombus (resolved; on AC), prior tobacco use (1 ppd x 30 years; quit 10/23), PAD, infrarenal AAA (4.7 cm), mesenteric arterial disease (celiac artery stenosis, occluded ROMARIO), HL, renal tumor s/p radical L nephrectomy (35 years prior) and R partial nephrectomy (? small carcinogenous spot), CKD (b/l Cr 1.3-1.5), prior DVT (s/p Rx) who is here for a follow up  Referred by Dr. Deep Bermudez. Also followed by Dr. Christopher Lee. Accompanied by wife, Laura.   For full initial details, please refer to note from 11/17/23.   No hospitalizations since last visit. Due to persistent LV dysfunction, referred to EP for consideration of ICD. Was to have it implanted but declined as he was told it wouldn't help him feel better.   Previously had Entresto reduced from 49/51 to 24/26 twice/day due to hyperkalemia and started on Lokelma 10 mg M/W/F without adverse reaction. Has been on Farxiga 5 mg daily (hasn't tried 10 mg daily).   He was referred to vascular Dr. Huerta ([facility]) for c/o cramping/tightening in legs bilaterally- vascular dopplers form 1/8/24 showed hemodynamically significant stenosis in R profunda femoral artery and left common iliac artery stenosis as well as saccular AAA 3.8 cm.  Has been on Lexapro 5 mg daily (but he hasn't taking it every day because he said he doesn't want to get addicted to it) for depression with prn Xanax. No longer smoking and using gum for cravings.   BP at home has been ranging 100-120. Weight has been stable at 119-122 pounds. Hasn't used PRN Lasix.   Tries to be cognizant about sodium but has indiscretions. Adherent to medications. Drinks approx 3-5 bottles of fluid per day.   Reports progressive fatigue; was previously able to walk 1 mile but had stopped due to cold weather. Currently limited by leg cramping which improves with rest. Denies orthopnea/PND  Denies chest discomfort, palpitations, dizziness/LH, syncope and he does not have an ICD.

## 2025-04-25 NOTE — RESULTS/DATA
[TextEntry] : 9/8/2024  LDL 87 K 5.5 BUN  35 Cr 1.47  5/29/24 LDL 79 K 4.4 BUN 36 Cr 1.49  4/4/25 - Na 137, K 5.0, Cl 105, BUN/Cr 41/1.7; Hb 14

## 2025-04-25 NOTE — PHYSICAL EXAM
[Well Developed] : well developed [Well Nourished] : well nourished [No Acute Distress] : no acute distress [Normal Conjunctiva] : normal conjunctiva [Normal Venous Pressure] : normal venous pressure [No Carotid Bruit] : no carotid bruit [Normal S1, S2] : normal S1, S2 [No Murmur] : no murmur [No Rub] : no rub [No Gallop] : no gallop [Clear Lung Fields] : clear lung fields [Good Air Entry] : good air entry [No Respiratory Distress] : no respiratory distress  [Soft] : abdomen soft [Non Tender] : non-tender [No Masses/organomegaly] : no masses/organomegaly [Normal Bowel Sounds] : normal bowel sounds [Normal Gait] : normal gait [No Edema] : no edema [No Cyanosis] : no cyanosis [No Clubbing] : no clubbing [No Varicosities] : no varicosities [No Rash] : no rash [No Skin Lesions] : no skin lesions [Moves all extremities] : moves all extremities [No Focal Deficits] : no focal deficits [Normal Speech] : normal speech [Alert and Oriented] : alert and oriented [Normal memory] : normal memory [de-identified] : JVP 6 cm w/o JASONR

## 2025-07-21 NOTE — HISTORY OF PRESENT ILLNESS
[FreeTextEntry1] : Mr. Lopez is a 68 y/o man with PMH triple-vessel CAD (unrevascularizable), HFrEF/ICM (EF 20%, LVEDD 7.3 cm; Dx 9/23) w/o ICD w/ prior LV thrombus (resolved; on AC), prior tobacco use (1 ppd x 30 years; quit 10/23), PAD, infrarenal AAA (4.7 cm), HL, renal tumor s/p radical L nephrectomy (35 years prior) and R partial nephrectomy (? small carcinogenous spot), CKD (b/l Cr 1.3-1.5), prior DVT (s/p Rx) who is here for follow up. Last visit 3/2025, it was recommended that he have an ICD implanted for primary prevention but declined at that time as he was afraid. He was seen by HF today and noted to have PVC's on EKG today. He is on GDMT for HF.  He now open to considering ICD placement as his last EF remained low 20-23%. He reports he just had another echo 5/2025 with Dr. Lee. Patient endorses fatigue with < 3 blocks. Denies chest pain, palpitations, SOB, syncope or near syncope.

## 2025-07-21 NOTE — CARDIOLOGY SUMMARY
[de-identified] : 7/21/25 Sinus bradycardia at 58 bpm with multi focal PVC's, NSST [de-identified] :  6/14/24 - pharmacologic stress test - EF 15%, severe reduced perfusion defect large size in apical wall (fixed), severe large perfusion defect inferior wall (fixed), mod reduced perfusion defect of large size in lateral wall (partially reversible), mod reduced perfusion defect large size septal wall (partially reversible)   [de-identified] : 8/9/24 - EF 20%, LVEDD 6.9 cm, nl RV size/function, mild MR, mild TR, E/e' 10 10/9/23 - EF 20%, LVEDD 7.3 cm, mild RV dilatation/dysfunction   [de-identified] : 10/1/23 CTA AP - complex aorta with intraluminal plaque or eccentric thrombus in the tortuous intrathoracic aorta. 4.7 cm maximum diameter infrarenal aortic aneurysm. High-grade appearing stenosis at the origin of the celiac axis. Multifocal fairly high grade common iliac artery stenoses, especially at the origin of the left.

## 2025-07-21 NOTE — DISCUSSION/SUMMARY
[FreeTextEntry1] : Impression:  1. HFrEF:  EKG performed today to assess for presence of conduction disease and reveals sinus bradycardia with PVCs. Last EF remains 20% with GDMT, recommendation for dual chamber ICD placement. A thorough discussion was had with the patient concerning all aspects of ICD therapy. We reviewed the data supporting ICD therapy and how it applies individually.  We discussed the procedures, risks and outcomes of ICD implantation an living with an ICD. We discussed management of ICD therapy throughout life, including deactivation of the ICD. After all questions were answered, and literature was provided, it was a shared decision to proceed with ICD therapy. Hold diabetes medication and blood thinners the morning of the procedure, if applicable. May take all other medication with a small sip of water. Still unsure if he wants to proceed, will consider and call if chooses to schedule. Resume OMT as prescribed, encouraged heart healthy diet, daily weight, and regular f/u with Cardiology/Heart failure team as scheduled. Will obtain recent echo from Dr. Lee and if LVEF <35%, will schedule ICD for primary prevention.   2. HLD: resume statin therapy as prescribed and regular f/u with Cardiologist for routine lipid monitoring and management.  Resume regular f/u with Cardiologist and may RTO as needed or if any new or worsening symptoms occur.

## 2025-07-25 NOTE — CARDIOLOGY SUMMARY
[de-identified] : 7/21/25 Sinus Bradycardia, frequent multiform PVC's, PRWP, nonspecific T-abnormality. 4/25/25 - unchanged 2/11/25 Sinus Bradycardia, 50bpm, Old inf/lat infarct, PRWP,  Diffuse nonspecific T-abnormality. 11/13/24 - sinus, NSR, PRWP 4/10/24 NSR 63, PRWP, TWI laterally (unchanged) 2/6/24 - NSR, PRWP, TWI laterally (unchanged) 1/4/24-NSR, PRWP, NSST with TWI 1, AVL, V3-V6 11/17/23 - NSR, APC, PRWP [de-identified] : 6/14/24 - pharmacologic stress test - EF 15%, severe reduced perfusion defect large size in apical wall (fixed), severe large perfusion defect inferior wall (fixed), mod reduced perfusion defect of large size in lateral wall (partially reversible), mod reduced perfusion defect large size septal wall (partially reversible) [de-identified] : 8/9/24 - EF 20%, LVEDD 6.9 cm, nl RV size/function, mild MR, mild TR, E/e' 10  10/9/23 - EF 20%, LVEDD 7.3 cm, mild RV dilatation/dysfunction [de-identified] : 11/21/23 MRI - severely dilated left ventricle with severely reduced overall systolic function (ejection fraction 13%). Myocardial infarction in a multi-vessel distribution. Predominantly non-viable myocardium in the anterior, lateral, and inferior walls. Viable myocardium in the basal to mid anteroseptum and inferoseptum. Mural thrombus at the apical cap of the left ventricle measuring approximately 18 mm x 7 mm. nl RV size/function. Severe atheroma in descending thoracic and abdominal aorta.  [de-identified] : 10/1/23 CTA AP - complex aorta with intraluminal plaque or eccentric thrombus in the tortuous intrathoracic aorta. 4.7 cm maximum diameter infrarenal aortic aneurysm. High-grade appearing stenosis at the origin of the celiac axis. Multifocal fairly high grade common iliac artery stenoses, especially at the origin of the left. [de-identified] : 9/29/23 prox % stenosis, mid % stenosis, distal % stenosis, RAMUS 99% stenosis, prox Cx 35 % stenosis, Dist Cx 40% stenosis, prox RCA 50% stenosis, mid % stenosis, distal RCA 45% stenosis.  [de-identified] : 10/9/23 - carotid dopplers - mod plaque in prox JOE with normal velocities (50-60%); mod plaque in LICA (<50%)

## 2025-07-25 NOTE — PHYSICAL EXAM
[Well Developed] : well developed [Well Nourished] : well nourished [No Acute Distress] : no acute distress [Normal Conjunctiva] : normal conjunctiva [Normal Venous Pressure] : normal venous pressure [No Carotid Bruit] : no carotid bruit [Normal S1, S2] : normal S1, S2 [No Rub] : no rub [No Gallop] : no gallop [Clear Lung Fields] : clear lung fields [Good Air Entry] : good air entry [No Respiratory Distress] : no respiratory distress  [Soft] : abdomen soft [Non Tender] : non-tender [No Masses/organomegaly] : no masses/organomegaly [Normal Bowel Sounds] : normal bowel sounds [Normal Gait] : normal gait [No Edema] : no edema [No Cyanosis] : no cyanosis [No Clubbing] : no clubbing [No Varicosities] : no varicosities [No Rash] : no rash [No Skin Lesions] : no skin lesions [Moves all extremities] : moves all extremities [No Focal Deficits] : no focal deficits [Normal Speech] : normal speech [Alert and Oriented] : alert and oriented [Normal memory] : normal memory [Murmur] : murmur [de-identified] : LSB ll/Vl systolic  [de-identified] : JVP 6 cm w/o JASONR

## 2025-07-25 NOTE — HISTORY OF PRESENT ILLNESS
[FreeTextEntry1] : Mr. Lopez is a 70 y/o M w/ h/o triple-vessel CAD (unrevascularizable), HFrEF/ICM (EF 20%, LVEDD 7.3 cm; Dx 9/23) w/o ICD w/ prior LV thrombus (resolved; on AC), prior tobacco use (1 ppd x 30 years; quit 10/23), PAD, infrarenal AAA (4.7 cm), mesenteric arterial disease (celiac artery stenosis, occluded ROMARIO), HL, renal tumor s/p radical L nephrectomy (35 years prior) and R partial nephrectomy (? small carcinogenous spot), CKD (b/l Cr 1.3-1.5), prior DVT (s/p Rx) who is here for a follow up  Referred by Dr. Deep Bermudez. Also followed by Dr. Christopher Lee. Accompanied by wife, Laura.   For full initial details, please refer to note from 11/17/23.   Patient presents today 7/21/5 for follow-up. Denies hospitalizations or ER visits since last office visit.  Has PCVC's on EKG today. He took his meds today but did not take Lokelma today.    Due to persistent LV dysfunction, referred to EP for consideration of ICD. Was to have it implanted but declined as he was told it wouldn't help him feel better.   Previously had Entresto reduced from 49/51 to 24/26 twice/day due to hyperkalemia and started on Lokelma 10 mg M/W/F without adverse reaction. Has been on Farxiga 5 mg daily (hasn't tried 10 mg daily).   He was referred to vascular Dr. Huerta ([facility]) for c/o cramping/tightening in legs bilaterally- vascular dopplers form 1/8/24 showed hemodynamically significant stenosis in R profunda femoral artery and left common iliac artery stenosis as well as saccular AAA 3.8 cm.  No longer smoking and using gum for cravings.   BP at home has been ranging 108-123.  Weight has been stable at 118-119 pounds. Hasn't used PRN Lasix.   Tries to be cognizant about sodium but has indiscretions. Adherent to medications. Drinks approx 3-5 16oz bottles of fluid per day.   Reports progressive leg and full body fatigue; was previously able to walk 1 mile and now stops at 3 blocks.  He is limited to Lower leg pain. Sleeps on flat bed with 1 pillow.  Denies orthopnea/PND  Denies chest discomfort, palpitations, dizziness/LH, syncope and he does not have an ICD.

## 2025-07-25 NOTE — PHYSICAL EXAM
[Well Developed] : well developed [Well Nourished] : well nourished [No Acute Distress] : no acute distress [Normal Conjunctiva] : normal conjunctiva [Normal Venous Pressure] : normal venous pressure [No Carotid Bruit] : no carotid bruit [Normal S1, S2] : normal S1, S2 [No Rub] : no rub [No Gallop] : no gallop [Clear Lung Fields] : clear lung fields [Good Air Entry] : good air entry [No Respiratory Distress] : no respiratory distress  [Soft] : abdomen soft [Non Tender] : non-tender [No Masses/organomegaly] : no masses/organomegaly [Normal Bowel Sounds] : normal bowel sounds [Normal Gait] : normal gait [No Edema] : no edema [No Cyanosis] : no cyanosis [No Clubbing] : no clubbing [No Varicosities] : no varicosities [No Rash] : no rash [No Skin Lesions] : no skin lesions [Moves all extremities] : moves all extremities [No Focal Deficits] : no focal deficits [Normal Speech] : normal speech [Alert and Oriented] : alert and oriented [Normal memory] : normal memory [Murmur] : murmur [de-identified] : LSB ll/Vl systolic  [de-identified] : JVP 6 cm w/o JASONR

## 2025-07-25 NOTE — ASSESSMENT
[FreeTextEntry1] : Mr. Lopez is a 68 y/o M w/ h/o triple-vessel CAD (unrevascularizable), HFrEF/ICM (EF 20%, LVEDD 7.3 cm; Dx 9/23) w/o ICD w/ prior LV thrombus (resolved; on AC), prior tobacco use (1 ppd x 30 years; quit 10/23), PAD, infrarenal AAA (4.7 cm), mesenteric arterial disease (celiac artery stenosis, occluded ROMARIO), HL, renal tumor s/p radical L nephrectomy (35 years prior) and R partial nephrectomy (? small carcinogenous spot), CKD (b/l Cr 1.3-1.5), prior DVT (s/p Rx) who is here for a follow up. Currently compensated, NYHA class II and euvolemic and normotensive but limited mainly due to claudication. Despite triple vessel disease, doesn't have any active angina and doesn't have revascularizable disease by PCI or CABG. Of note, had LV thrombus seen on MRI which was no longer seen on echo.  PVC's today on EKG   1. HFrEF/ICM  - c/w toprol 25 mg daily  - c/w Entresto 24/26 twice/day; unable to uptitrate d/t K 5.0 - c/w hydralazine 25 mg TID - c/w isosorbide 10 mg TID - not on MRA due to creat 1.5->1.37, elevated K and solitary kidney, now on Lokelma 10 gm three times/week - on Farxiga 5 mg daily; ideally would benefit from 10 mg daily  - no diuretic requirement; given lasix 20 mg prn for weight gain - counseled on disease process  - maintain log of weight/BP - pt given HF booklet to review with his wife - repeat KARYN EF 20% - previously referred to EP for ICD eval, pt seen by Dr. Perez (was previously reluctant because he is afraid of placing a device) - has PVC's on EKG, will check electrolytes, discussed possible revisiting placement of ICD, asked EP to see him, did not take Lokelma today   2. CAD - triple vessel disease; unrevascularizable - no active angina - off ASA and is on plavix and Lipitor and Zetia; continue Eliquis for Hx LV thrombus - s/p cardiac MRI which was negative for viability - may benefit from [id number] given extent of disease  3. PAD/infrarenal AAA (4.7 cm) - encouraged to continue smoking cessation; didn't tolerate Wellbutrin - vascular dopplers form 1/8/24 which showed hemodynamically significant stenosis in R profunda femoral artery and left common iliac artery stenosis as well as saccular AAA 3.8 cm.  -following Dr. Justus Huerta (vascular surgeon at [facility]).  - BP and lipids control  4. LV thrombus - resolved - c/w Eliquis 5 mg twice/day   RTC 8 weeks with PA

## 2025-07-25 NOTE — ASSESSMENT
[FreeTextEntry1] : Mr. Lopez is a 68 y/o M w/ h/o triple-vessel CAD (unrevascularizable), HFrEF/ICM (EF 20%, LVEDD 7.3 cm; Dx 9/23) w/o ICD w/ prior LV thrombus (resolved; on AC), prior tobacco use (1 ppd x 30 years; quit 10/23), PAD, infrarenal AAA (4.7 cm), mesenteric arterial disease (celiac artery stenosis, occluded ROMARIO), HL, renal tumor s/p radical L nephrectomy (35 years prior) and R partial nephrectomy (? small carcinogenous spot), CKD (b/l Cr 1.3-1.5), prior DVT (s/p Rx) who is here for a follow up. Currently compensated, NYHA class II and euvolemic and normotensive but limited mainly due to claudication. Despite triple vessel disease, doesn't have any active angina and doesn't have revascularizable disease by PCI or CABG. Of note, had LV thrombus seen on MRI which was no longer seen on echo.  PVC's today on EKG   1. HFrEF/ICM  - c/w toprol 25 mg daily  - c/w Entresto 24/26 twice/day; unable to uptitrate d/t K 5.0 - c/w hydralazine 25 mg TID - c/w isosorbide 10 mg TID - not on MRA due to creat 1.5->1.37, elevated K and solitary kidney, now on Lokelma 10 gm three times/week - on Farxiga 5 mg daily; ideally would benefit from 10 mg daily  - no diuretic requirement; given lasix 20 mg prn for weight gain - counseled on disease process  - maintain log of weight/BP - pt given HF booklet to review with his wife - repeat KARYN EF 20% - previously referred to EP for ICD eval, pt seen by Dr. Perez (was previously reluctant because he is afraid of placing a device) - has PVC's on EKG, will check electrolytes, discussed possible revisiting placement of ICD, asked EP to see him, did not take Lokelma today   2. CAD - triple vessel disease; unrevascularizable - no active angina - off ASA and is on plavix and Lipitor and Zetia; continue Eliquis for Hx LV thrombus - s/p cardiac MRI which was negative for viability - may benefit from [id number] given extent of disease  3. PAD/infrarenal AAA (4.7 cm) - encouraged to continue smoking cessation; didn't tolerate Wellbutrin - vascular dopplers form 1/8/24 which showed hemodynamically significant stenosis in R profunda femoral artery and left common iliac artery stenosis as well as saccular AAA 3.8 cm.  -following Dr. Justus Huerta (vascular surgeon at [facility]).  - BP and lipids control  4. LV thrombus - resolved - c/w Eliquis 5 mg twice/day   RTC 8 weeks with PA

## 2025-07-25 NOTE — CARDIOLOGY SUMMARY
[de-identified] : 7/21/25 Sinus Bradycardia, frequent multiform PVC's, PRWP, nonspecific T-abnormality. 4/25/25 - unchanged 2/11/25 Sinus Bradycardia, 50bpm, Old inf/lat infarct, PRWP,  Diffuse nonspecific T-abnormality. 11/13/24 - sinus, NSR, PRWP 4/10/24 NSR 63, PRWP, TWI laterally (unchanged) 2/6/24 - NSR, PRWP, TWI laterally (unchanged) 1/4/24-NSR, PRWP, NSST with TWI 1, AVL, V3-V6 11/17/23 - NSR, APC, PRWP [de-identified] : 6/14/24 - pharmacologic stress test - EF 15%, severe reduced perfusion defect large size in apical wall (fixed), severe large perfusion defect inferior wall (fixed), mod reduced perfusion defect of large size in lateral wall (partially reversible), mod reduced perfusion defect large size septal wall (partially reversible) [de-identified] : 8/9/24 - EF 20%, LVEDD 6.9 cm, nl RV size/function, mild MR, mild TR, E/e' 10  10/9/23 - EF 20%, LVEDD 7.3 cm, mild RV dilatation/dysfunction [de-identified] : 11/21/23 MRI - severely dilated left ventricle with severely reduced overall systolic function (ejection fraction 13%). Myocardial infarction in a multi-vessel distribution. Predominantly non-viable myocardium in the anterior, lateral, and inferior walls. Viable myocardium in the basal to mid anteroseptum and inferoseptum. Mural thrombus at the apical cap of the left ventricle measuring approximately 18 mm x 7 mm. nl RV size/function. Severe atheroma in descending thoracic and abdominal aorta.  [de-identified] : 10/1/23 CTA AP - complex aorta with intraluminal plaque or eccentric thrombus in the tortuous intrathoracic aorta. 4.7 cm maximum diameter infrarenal aortic aneurysm. High-grade appearing stenosis at the origin of the celiac axis. Multifocal fairly high grade common iliac artery stenoses, especially at the origin of the left. [de-identified] : 9/29/23 prox % stenosis, mid % stenosis, distal % stenosis, RAMUS 99% stenosis, prox Cx 35 % stenosis, Dist Cx 40% stenosis, prox RCA 50% stenosis, mid % stenosis, distal RCA 45% stenosis.  [de-identified] : 10/9/23 - carotid dopplers - mod plaque in prox JOE with normal velocities (50-60%); mod plaque in LICA (<50%)

## 2025-07-25 NOTE — HISTORY OF PRESENT ILLNESS
[FreeTextEntry1] : Mr. Lopez is a 70 y/o M w/ h/o triple-vessel CAD (unrevascularizable), HFrEF/ICM (EF 20%, LVEDD 7.3 cm; Dx 9/23) w/o ICD w/ prior LV thrombus (resolved; on AC), prior tobacco use (1 ppd x 30 years; quit 10/23), PAD, infrarenal AAA (4.7 cm), mesenteric arterial disease (celiac artery stenosis, occluded ROMARIO), HL, renal tumor s/p radical L nephrectomy (35 years prior) and R partial nephrectomy (? small carcinogenous spot), CKD (b/l Cr 1.3-1.5), prior DVT (s/p Rx) who is here for a follow up  Referred by Dr. Deep Bermudez. Also followed by Dr. Christopher Lee. Accompanied by wife, Laura.   For full initial details, please refer to note from 11/17/23.   Patient presents today 7/21/5 for follow-up. Denies hospitalizations or ER visits since last office visit.  Has PCVC's on EKG today. He took his meds today but did not take Lokelma today.    Due to persistent LV dysfunction, referred to EP for consideration of ICD. Was to have it implanted but declined as he was told it wouldn't help him feel better.   Previously had Entresto reduced from 49/51 to 24/26 twice/day due to hyperkalemia and started on Lokelma 10 mg M/W/F without adverse reaction. Has been on Farxiga 5 mg daily (hasn't tried 10 mg daily).   He was referred to vascular Dr. Huerta ([facility]) for c/o cramping/tightening in legs bilaterally- vascular dopplers form 1/8/24 showed hemodynamically significant stenosis in R profunda femoral artery and left common iliac artery stenosis as well as saccular AAA 3.8 cm.  No longer smoking and using gum for cravings.   BP at home has been ranging 108-123.  Weight has been stable at 118-119 pounds. Hasn't used PRN Lasix.   Tries to be cognizant about sodium but has indiscretions. Adherent to medications. Drinks approx 3-5 16oz bottles of fluid per day.   Reports progressive leg and full body fatigue; was previously able to walk 1 mile and now stops at 3 blocks.  He is limited to Lower leg pain. Sleeps on flat bed with 1 pillow.  Denies orthopnea/PND  Denies chest discomfort, palpitations, dizziness/LH, syncope and he does not have an ICD.